# Patient Record
Sex: MALE | Race: WHITE | Employment: FULL TIME | URBAN - METROPOLITAN AREA
[De-identification: names, ages, dates, MRNs, and addresses within clinical notes are randomized per-mention and may not be internally consistent; named-entity substitution may affect disease eponyms.]

---

## 2024-09-18 ENCOUNTER — OFFICE VISIT (OUTPATIENT)
Dept: ENDOCRINOLOGY | Facility: HOSPITAL | Age: 19
End: 2024-09-18
Payer: COMMERCIAL

## 2024-09-18 VITALS
SYSTOLIC BLOOD PRESSURE: 122 MMHG | OXYGEN SATURATION: 98 % | HEART RATE: 65 BPM | WEIGHT: 161.6 LBS | HEIGHT: 67 IN | DIASTOLIC BLOOD PRESSURE: 70 MMHG | BODY MASS INDEX: 25.36 KG/M2

## 2024-09-18 DIAGNOSIS — E10.65 TYPE 1 DIABETES MELLITUS WITH HYPERGLYCEMIA (HCC): Primary | ICD-10-CM

## 2024-09-18 PROCEDURE — 99204 OFFICE O/P NEW MOD 45 MIN: CPT | Performed by: STUDENT IN AN ORGANIZED HEALTH CARE EDUCATION/TRAINING PROGRAM

## 2024-09-18 PROCEDURE — 95251 CONT GLUC MNTR ANALYSIS I&R: CPT | Performed by: STUDENT IN AN ORGANIZED HEALTH CARE EDUCATION/TRAINING PROGRAM

## 2024-09-18 RX ORDER — INSULIN GLARGINE 100 [IU]/ML
12 INJECTION, SOLUTION SUBCUTANEOUS
Qty: 15 ML | Refills: 1 | Status: SHIPPED | OUTPATIENT
Start: 2024-09-18

## 2024-09-18 RX ORDER — INSULIN LISPRO 100 [IU]/ML
5-10 INJECTION, SOLUTION INTRAVENOUS; SUBCUTANEOUS
Qty: 15 ML | Refills: 2 | Status: SHIPPED | OUTPATIENT
Start: 2024-09-18

## 2024-09-18 RX ORDER — ACYCLOVIR 400 MG/1
1 TABLET ORAL
Qty: 10 EACH | Refills: 2 | Status: SHIPPED | OUTPATIENT
Start: 2024-09-18

## 2024-09-18 RX ORDER — INSULIN GLARGINE 100 [IU]/ML
INJECTION, SOLUTION SUBCUTANEOUS
COMMUNITY
End: 2024-09-18 | Stop reason: SDUPTHER

## 2024-09-18 RX ORDER — ACYCLOVIR 400 MG/1
1 TABLET ORAL
COMMUNITY
End: 2024-09-18 | Stop reason: SDUPTHER

## 2024-09-18 NOTE — PROGRESS NOTES
"  New Patient Consult Note      Chief Complaint   Patient presents with    Diabetes Type 1      Referring Provider  Referral Self  No address on file     History of Present Illness:   Jean Claude Persaud is a 19 y.o. male with a history of type 1 diabetes since age 18, Without any complications on  MDI who presents today for diabetes management. Used to follow endocrine at [facility] in [place], [place]    Patient was first diagnosed with T1DM-  18 yrs age  Control since diagnosis: reports last A1C in march and was in 6ish range   Medications tried thus far: tried omnipod but doesn't want more technology. Likes MDI  Current regime:- semglee 15u at night, lispro for meals CR 1:15 for bk and early lunch, 1:20 for late ln or dinner. ISF 1:50  BG control:- Jean Claude Cori   Device used - Dexcom G7    Indication   Type 1 Diabetes  More than 72 hours of data was reviewed. Report to be scanned to chart.     Date Range: Sept 1- sep 14 2024    Analysis of data:   Average Glucose: 140  GMI 6.7%  Time in Target Range: 81%   Time Above Range: 14%/2%   Time Below Range: 3%/<1%     Interpretation of data:  BG overall in range    Hypoglycemic episodes:  rare  Hyperglycemia symptoms: no polyuria or polydipsia\",\"no chest pain, dyspnea or TIAs\",\"no numbness, tingling or pain in extremities\" does have some tingling when misses his basal  Diet: eats 3 meals and feels comfortable with carb counting  Activity: stays active     HOSPITALIZATIONS:   Hospitalizations or ED visit for DKA: 1x when diagnosed  Hospitalizations or ED visit for hypoglycemia: never  Hospitalizations/ED visits since the last office visit:  None    COMPLICATIONS OF DIABETES:   Eye disease in the past:  can wait for now since hasn't been >5 ys since his dx  No labs on file     Social Hx:- works in [employer]   Family HX:- GM had T1    Patient Active Problem List   Diagnosis    Type 1 diabetes mellitus with hyperglycemia (HCC)      History reviewed. No pertinent " "past medical history.   History reviewed. No pertinent surgical history.   History reviewed. No pertinent family history.  Social History     Tobacco Use    Smoking status: Never    Smokeless tobacco: Never   Substance Use Topics    Alcohol use: Not on file     No Known Allergies      Current Outpatient Medications:     Continuous Glucose Sensor (Dexcom G7 Sensor), Use 1 Device every 10 days, Disp: 10 each, Rfl: 2    glucose blood test strip, Use 1 each daily as needed Use as instructed, Disp: , Rfl:     insulin glargine (Semglee) 100 units/mL subcutaneous injection, Inject 12 Units under the skin daily at bedtime 12 units at night, Disp: 15 mL, Rfl: 1    insulin lispro (HumaLOG KwikPen) 100 units/mL injection pen, Inject 5-10 Units under the skin 3 (three) times a day with meals Per carbs with meals, Disp: 15 mL, Rfl: 2  Review of Systems   Constitutional:  Negative for chills and fever.   HENT:  Negative for ear pain and sore throat.    Eyes:  Negative for pain and visual disturbance.   Respiratory:  Negative for cough and shortness of breath.    Cardiovascular:  Negative for chest pain and palpitations.   Gastrointestinal:  Negative for abdominal pain and vomiting.   Genitourinary:  Negative for dysuria and hematuria.   Musculoskeletal:  Negative for arthralgias and back pain.   Skin:  Negative for color change and rash.   Neurological:  Negative for seizures and syncope.   All other systems reviewed and are negative.      Physical Exam:  Body mass index is 25.31 kg/m².  /70   Pulse 65   Ht 5' 7\" (1.702 m)   Wt 73.3 kg (161 lb 9.6 oz)   SpO2 98%   BMI 25.31 kg/m²    Wt Readings from Last 3 Encounters:   09/18/24 73.3 kg (161 lb 9.6 oz)       Physical Exam  Constitutional:       Appearance: Normal appearance. He is normal weight.   Cardiovascular:      Rate and Rhythm: Normal rate and regular rhythm.      Pulses: Normal pulses. no weak pulses.           Dorsalis pedis pulses are 2+ on the right side and " "2+ on the left side.   Pulmonary:      Effort: Pulmonary effort is normal.   Abdominal:      General: Abdomen is flat.      Palpations: Abdomen is soft.   Feet:      Right foot:      Skin integrity: No ulcer, skin breakdown, erythema, warmth, callus or dry skin.      Left foot:      Skin integrity: No ulcer, skin breakdown, erythema, warmth, callus or dry skin.   Skin:     General: Skin is warm.      Capillary Refill: Capillary refill takes less than 2 seconds.   Neurological:      General: No focal deficit present.      Mental Status: He is alert.       Patient's shoes and socks removed.    Right Foot/Ankle   Right Foot Inspection  Skin Exam: skin normal and skin intact. No dry skin, no warmth, no callus, no erythema, no maceration, no abnormal color, no pre-ulcer, no ulcer and no callus.     Toe Exam: ROM and strength within normal limits.     Sensory   Vibration: intact  Monofilament testing: intact    Vascular  Capillary refills: < 3 seconds  The right DP pulse is 2+.     Left Foot/Ankle  Left Foot Inspection  Skin Exam: skin normal and skin intact. No dry skin, no warmth, no erythema, no maceration, normal color, no pre-ulcer, no ulcer and no callus.     Toe Exam: ROM and strength within normal limits.     Sensory   Vibration: intact  Monofilament testing: intact    Vascular  Capillary refills: < 3 seconds  The left DP pulse is 2+.     Assign Risk Category  No deformity present  No loss of protective sensation  No weak pulses  Risk: 0      Labs:   No components found for: \"HA1C\"  No components found for: \"GLU\"    No results found for: \"CREATININE\", \"BUN\", \"NA\", \"K\", \"CL\", \"CO2\"  No results found for: \"EGFR\"  No components found for: \"MALBCRER\"    No results found for: \"CHOL\", \"HDL\", \"LDLCAL\", \"TRIG\", \"CHOLHDL\"    No results found for: \"ALT\", \"AST\", \"GGT\", \"ALKPHOS\", \"BILITOT\"    No results found for: \"TSH\", \"FREET4\", \"TSI\"    Impression:  1. Type 1 diabetes mellitus with hyperglycemia (HCC)     "       Plan:    Jean Claude was seen today for diabetes type 1.    Diagnoses and all orders for this visit:    Type 1 diabetes mellitus with hyperglycemia (HCC)  -     Comprehensive metabolic panel; Future  -     Albumin / creatinine urine ratio; Future  -     Lipid panel; Future  -     Hemoglobin A1C; Future  -     TSH, 3rd generation; Future  -     T4, free; Future  -     C-peptide; Future  -     GAD65, IA-2, and Insulin Autoantibody; Future  -     insulin glargine (Semglee) 100 units/mL subcutaneous injection; Inject 12 Units under the skin daily at bedtime 12 units at night  -     insulin lispro (HumaLOG KwikPen) 100 units/mL injection pen; Inject 5-10 Units under the skin 3 (three) times a day with meals Per carbs with meals  -     Continuous Glucose Sensor (Dexcom G7 Sensor); Use 1 Device every 10 days  -     Comprehensive metabolic panel; Future  -     Hemoglobin A1C; Future  -     Lipid panel; Future      There are no diagnoses linked to this encounter.      Patient is a 19yM With PMHx of T1DM since Without any complications Who presents today for diabetic care.  Patient currently on MDI    1) T1DM:- Patients diabetes is well under control based on his CGM data. TIR 81% with lows 3% and very low <1% which are all under ADA guidelines. He is not having major highs or lows. Is on MDI and feels comfortable with this. Doesn't want more technology at this time, defer pump. We will not change his regime, will order insulin and sensor. Has glucagon and ketostix. Will see him again in 3 months and if control still stable can push visits to 6 months     Screening:-   Retinopathy- Doesn't need it yet  Nephropathy- Will order  Lipide levels- Will order    RTC in 3 months     Discussed with the patient and all questioned fully answered. He will call me if any problems arise.    Counseled patient on diagnostic results, prognosis, risk and benefit of treatment options, instruction for management, importance of treatment  compliance, Risk  factor reduction and impressions      Sylvia Lobo MD

## 2024-09-23 ENCOUNTER — TELEPHONE (OUTPATIENT)
Dept: ENDOCRINOLOGY | Facility: HOSPITAL | Age: 19
End: 2024-09-23

## 2024-09-23 NOTE — TELEPHONE ENCOUNTER
Faxed request for medical records from patient's previous endocrinologist, [facility], [place]